# Patient Record
Sex: MALE | Race: WHITE | NOT HISPANIC OR LATINO | Employment: FULL TIME | URBAN - METROPOLITAN AREA
[De-identification: names, ages, dates, MRNs, and addresses within clinical notes are randomized per-mention and may not be internally consistent; named-entity substitution may affect disease eponyms.]

---

## 2024-03-06 ENCOUNTER — HOSPITAL ENCOUNTER (EMERGENCY)
Facility: HOSPITAL | Age: 29
Discharge: HOME | End: 2024-03-06
Payer: COMMERCIAL

## 2024-03-06 VITALS
HEART RATE: 88 BPM | WEIGHT: 185 LBS | TEMPERATURE: 98.6 F | SYSTOLIC BLOOD PRESSURE: 142 MMHG | BODY MASS INDEX: 27.4 KG/M2 | DIASTOLIC BLOOD PRESSURE: 94 MMHG | RESPIRATION RATE: 16 BRPM | OXYGEN SATURATION: 100 % | HEIGHT: 69 IN

## 2024-03-06 DIAGNOSIS — H61.23 BILATERAL IMPACTED CERUMEN: ICD-10-CM

## 2024-03-06 DIAGNOSIS — H92.02 LEFT EAR PAIN: Primary | ICD-10-CM

## 2024-03-06 PROCEDURE — 2500000001 HC RX 250 WO HCPCS SELF ADMINISTERED DRUGS (ALT 637 FOR MEDICARE OP): Performed by: PHYSICIAN ASSISTANT

## 2024-03-06 PROCEDURE — 2500000004 HC RX 250 GENERAL PHARMACY W/ HCPCS (ALT 636 FOR OP/ED): Performed by: PHYSICIAN ASSISTANT

## 2024-03-06 PROCEDURE — 99283 EMERGENCY DEPT VISIT LOW MDM: CPT

## 2024-03-06 RX ORDER — LORATADINE 10 MG/1
10 TABLET ORAL ONCE
Status: COMPLETED | OUTPATIENT
Start: 2024-03-06 | End: 2024-03-06

## 2024-03-06 RX ORDER — IBUPROFEN 600 MG/1
600 TABLET ORAL EVERY 6 HOURS PRN
Qty: 20 TABLET | Refills: 0 | Status: SHIPPED | OUTPATIENT
Start: 2024-03-06

## 2024-03-06 RX ORDER — LORATADINE 10 MG/1
10 TABLET ORAL DAILY
Qty: 20 TABLET | Refills: 0 | Status: SHIPPED | OUTPATIENT
Start: 2024-03-06 | End: 2024-03-26

## 2024-03-06 RX ORDER — ACETAMINOPHEN 325 MG/1
975 TABLET ORAL ONCE
Status: COMPLETED | OUTPATIENT
Start: 2024-03-06 | End: 2024-03-06

## 2024-03-06 RX ORDER — AMOXICILLIN 500 MG/1
500 CAPSULE ORAL 3 TIMES DAILY
Qty: 21 CAPSULE | Refills: 0 | Status: SHIPPED | OUTPATIENT
Start: 2024-03-06 | End: 2024-03-13

## 2024-03-06 RX ADMIN — LORATADINE 10 MG: 10 TABLET ORAL at 12:54

## 2024-03-06 RX ADMIN — ACETAMINOPHEN 975 MG: 325 TABLET ORAL at 12:54

## 2024-03-06 RX ADMIN — Medication 10 DROP: at 12:40

## 2024-03-06 ASSESSMENT — COLUMBIA-SUICIDE SEVERITY RATING SCALE - C-SSRS
2. HAVE YOU ACTUALLY HAD ANY THOUGHTS OF KILLING YOURSELF?: NO
6. HAVE YOU EVER DONE ANYTHING, STARTED TO DO ANYTHING, OR PREPARED TO DO ANYTHING TO END YOUR LIFE?: NO
1. IN THE PAST MONTH, HAVE YOU WISHED YOU WERE DEAD OR WISHED YOU COULD GO TO SLEEP AND NOT WAKE UP?: NO

## 2024-03-06 ASSESSMENT — PAIN SCALES - GENERAL: PAINLEVEL_OUTOF10: 8

## 2024-03-06 ASSESSMENT — PAIN - FUNCTIONAL ASSESSMENT: PAIN_FUNCTIONAL_ASSESSMENT: 0-10

## 2024-03-06 NOTE — ED PROVIDER NOTES
HPI   Chief Complaint   Patient presents with    Earache     L ear       28-year-old male presented emergency department the chief complaint of pain in his left ear.  This been for the last several days.  He notes decreased hearing.  He denies lightheadedness dizziness numbness weakness.  Well-appearing nontoxic afebrile.  No significant past medical history.  Has tried nothing for relief.                          Michelle Coma Scale Score: 15                     Patient History   No past medical history on file.  No past surgical history on file.  No family history on file.  Social History     Tobacco Use    Smoking status: Not on file    Smokeless tobacco: Not on file   Substance Use Topics    Alcohol use: Not on file    Drug use: Not on file       Physical Exam   ED Triage Vitals [03/06/24 1232]   Temperature Heart Rate Respirations BP   37 °C (98.6 °F) 88 16 (!) 142/94      Pulse Ox Temp src Heart Rate Source Patient Position   100 % -- -- --      BP Location FiO2 (%)     -- --       Physical Exam  Vitals and nursing note reviewed.   Constitutional:       Appearance: Normal appearance.   HENT:      Ears:      Comments: With bilateral cerumen impaction, unable to visualize tympanic membrane     Nose: Nose normal.   Cardiovascular:      Rate and Rhythm: Normal rate and regular rhythm.   Pulmonary:      Effort: Pulmonary effort is normal.   Abdominal:      General: Abdomen is flat.   Musculoskeletal:         General: Normal range of motion.      Cervical back: Normal range of motion.   Skin:     General: Skin is warm.   Neurological:      General: No focal deficit present.      Mental Status: He is alert and oriented to person, place, and time.   Psychiatric:         Mood and Affect: Mood normal.         ED Course & MDM   Diagnoses as of 03/06/24 1240   Left ear pain   Bilateral impacted cerumen       Medical Decision Making  I have seen and evaluated this patient.  Physician available for consultation.  Vital signs  have been reviewed.  All laboratory and diagnostic imaging is reviewed by myself and interpreted by myself unless otherwise stated.  Additionally imaging is interpreted by radiologist.    With cerumen impaction.  Difficulty visualizing tympanic membrane.  Will give patient Debrox drops.  The emergency department is fairly busy at the moment and will take them several hours probably before we are able to irrigate his ears.  He is given the option of a watch and wait antibiotic and taking Debrox drops at home and if after several days of Debrox treatments his pain is not resolved he can start the antibiotic and follow-up with ENT.  He is agreeable to this plan.  Released in good condition.    Labs Reviewed - No data to display  No orders to display     Medications   carbamide peroxide (Debrox) 6.5 % otic solution 10 drop (has no administration in time range)   acetaminophen (Tylenol) tablet 975 mg (has no administration in time range)   loratadine (Claritin) tablet 10 mg (has no administration in time range)     New Prescriptions    AMOXICILLIN (AMOXIL) 500 MG CAPSULE    Take 1 capsule (500 mg) by mouth 3 times a day for 7 days.    CARBAMIDE PEROXIDE (DEBROX) 6.5 % OTIC SOLUTION    Administer 5 drops into affected ear(s) 2 times a day for 4 days.    IBUPROFEN 600 MG TABLET    Take 1 tablet (600 mg) by mouth every 6 hours if needed for mild pain (1 - 3) for up to 20 doses.    LORATADINE (CLARITIN) 10 MG TABLET    Take 1 tablet (10 mg) by mouth once daily for 20 days.         Procedure  Procedures     Darin Banegas PA-C  03/06/24 6431

## 2025-01-12 ENCOUNTER — HOSPITAL ENCOUNTER (EMERGENCY)
Facility: HOSPITAL | Age: 30
Discharge: HOME | End: 2025-01-13

## 2025-01-12 ENCOUNTER — HOSPITAL ENCOUNTER (EMERGENCY)
Facility: HOSPITAL | Age: 30
Discharge: ANOTHER HOSPITAL | End: 2025-01-12
Attending: EMERGENCY MEDICINE

## 2025-01-12 VITALS
SYSTOLIC BLOOD PRESSURE: 126 MMHG | OXYGEN SATURATION: 98 % | HEART RATE: 78 BPM | DIASTOLIC BLOOD PRESSURE: 86 MMHG | WEIGHT: 175 LBS | TEMPERATURE: 97.8 F | RESPIRATION RATE: 16 BRPM | HEIGHT: 69 IN | BODY MASS INDEX: 25.92 KG/M2

## 2025-01-12 VITALS
DIASTOLIC BLOOD PRESSURE: 91 MMHG | WEIGHT: 175 LBS | OXYGEN SATURATION: 96 % | HEART RATE: 95 BPM | RESPIRATION RATE: 20 BRPM | SYSTOLIC BLOOD PRESSURE: 137 MMHG | HEIGHT: 69 IN | BODY MASS INDEX: 25.92 KG/M2 | TEMPERATURE: 97.7 F

## 2025-01-12 DIAGNOSIS — T15.91XA FB EYE, RIGHT, INITIAL ENCOUNTER: Primary | ICD-10-CM

## 2025-01-12 DIAGNOSIS — T15.91XA FOREIGN BODY OF RIGHT EYE, INITIAL ENCOUNTER: Primary | ICD-10-CM

## 2025-01-12 PROCEDURE — 99283 EMERGENCY DEPT VISIT LOW MDM: CPT

## 2025-01-12 PROCEDURE — 2500000001 HC RX 250 WO HCPCS SELF ADMINISTERED DRUGS (ALT 637 FOR MEDICARE OP): Performed by: PHYSICIAN ASSISTANT

## 2025-01-12 PROCEDURE — 99284 EMERGENCY DEPT VISIT MOD MDM: CPT

## 2025-01-12 PROCEDURE — 2500000005 HC RX 250 GENERAL PHARMACY W/O HCPCS

## 2025-01-12 PROCEDURE — 99284 EMERGENCY DEPT VISIT MOD MDM: CPT | Performed by: PHYSICIAN ASSISTANT

## 2025-01-12 PROCEDURE — 99285 EMERGENCY DEPT VISIT HI MDM: CPT | Performed by: EMERGENCY MEDICINE

## 2025-01-12 RX ORDER — TETRACAINE HYDROCHLORIDE 5 MG/ML
SOLUTION OPHTHALMIC
Status: COMPLETED
Start: 2025-01-12 | End: 2025-01-12

## 2025-01-12 RX ORDER — HYDROCODONE BITARTRATE AND ACETAMINOPHEN 5; 325 MG/1; MG/1
1 TABLET ORAL ONCE
Status: COMPLETED | OUTPATIENT
Start: 2025-01-12 | End: 2025-01-12

## 2025-01-12 RX ORDER — OXYCODONE HYDROCHLORIDE 5 MG/1
5 TABLET ORAL ONCE
Status: COMPLETED | OUTPATIENT
Start: 2025-01-12 | End: 2025-01-12

## 2025-01-12 RX ORDER — TETRACAINE HYDROCHLORIDE 5 MG/ML
1 SOLUTION OPHTHALMIC ONCE
Status: COMPLETED | OUTPATIENT
Start: 2025-01-12 | End: 2025-01-12

## 2025-01-12 RX ADMIN — WATER, PURIFIED: 99.05 LIQUID OPHTHALMIC at 21:08

## 2025-01-12 RX ADMIN — TETRACAINE HYDROCHLORIDE 1 DROP: 5 SOLUTION OPHTHALMIC at 21:08

## 2025-01-12 RX ADMIN — FLUORESCEIN SODIUM 1 STRIP: 1 STRIP OPHTHALMIC at 21:08

## 2025-01-12 RX ADMIN — HYDROCODONE BITARTRATE AND ACETAMINOPHEN 1 TABLET: 5; 325 TABLET ORAL at 21:07

## 2025-01-12 RX ADMIN — OXYCODONE 5 MG: 5 TABLET ORAL at 22:56

## 2025-01-12 ASSESSMENT — PAIN DESCRIPTION - PAIN TYPE
TYPE: ACUTE PAIN
TYPE: ACUTE PAIN

## 2025-01-12 ASSESSMENT — COLUMBIA-SUICIDE SEVERITY RATING SCALE - C-SSRS
2. HAVE YOU ACTUALLY HAD ANY THOUGHTS OF KILLING YOURSELF?: NO
1. IN THE PAST MONTH, HAVE YOU WISHED YOU WERE DEAD OR WISHED YOU COULD GO TO SLEEP AND NOT WAKE UP?: NO
6. HAVE YOU EVER DONE ANYTHING, STARTED TO DO ANYTHING, OR PREPARED TO DO ANYTHING TO END YOUR LIFE?: NO
1. IN THE PAST MONTH, HAVE YOU WISHED YOU WERE DEAD OR WISHED YOU COULD GO TO SLEEP AND NOT WAKE UP?: NO
2. HAVE YOU ACTUALLY HAD ANY THOUGHTS OF KILLING YOURSELF?: NO
6. HAVE YOU EVER DONE ANYTHING, STARTED TO DO ANYTHING, OR PREPARED TO DO ANYTHING TO END YOUR LIFE?: NO

## 2025-01-12 ASSESSMENT — PAIN DESCRIPTION - LOCATION
LOCATION: EYE
LOCATION: EYE

## 2025-01-12 ASSESSMENT — PAIN SCALES - GENERAL
PAINLEVEL_OUTOF10: 9
PAINLEVEL_OUTOF10: 8

## 2025-01-12 ASSESSMENT — PAIN - FUNCTIONAL ASSESSMENT
PAIN_FUNCTIONAL_ASSESSMENT: 0-10
PAIN_FUNCTIONAL_ASSESSMENT: 0-10

## 2025-01-12 ASSESSMENT — LIFESTYLE VARIABLES
EVER HAD A DRINK FIRST THING IN THE MORNING TO STEADY YOUR NERVES TO GET RID OF A HANGOVER: NO
HAVE YOU EVER FELT YOU SHOULD CUT DOWN ON YOUR DRINKING: NO
TOTAL SCORE: 0
HAVE PEOPLE ANNOYED YOU BY CRITICIZING YOUR DRINKING: NO
EVER FELT BAD OR GUILTY ABOUT YOUR DRINKING: NO

## 2025-01-12 ASSESSMENT — PAIN DESCRIPTION - ORIENTATION
ORIENTATION: RIGHT
ORIENTATION: RIGHT

## 2025-01-12 ASSESSMENT — PAIN DESCRIPTION - FREQUENCY: FREQUENCY: CONSTANT/CONTINUOUS

## 2025-01-12 NOTE — Clinical Note
Sher Bustos was seen and treated in our emergency department on 1/12/2025.  He may return to work on 01/16/2025.       If you have any questions or concerns, please don't hesitate to call.      Jeanne Hernandez PA-C

## 2025-01-13 RX ORDER — OXYCODONE AND ACETAMINOPHEN 5; 325 MG/1; MG/1
1 TABLET ORAL EVERY 6 HOURS PRN
Qty: 8 TABLET | Refills: 0 | Status: SHIPPED | OUTPATIENT
Start: 2025-01-13 | End: 2025-01-15

## 2025-01-13 RX ORDER — MOXIFLOXACIN 5 MG/ML
1 SOLUTION/ DROPS OPHTHALMIC 4 TIMES DAILY
Qty: 3 ML | Refills: 0 | Status: SHIPPED | OUTPATIENT
Start: 2025-01-13 | End: 2025-01-18

## 2025-01-13 RX ORDER — IBUPROFEN 600 MG/1
600 TABLET ORAL EVERY 6 HOURS PRN
Qty: 20 TABLET | Refills: 0 | Status: SHIPPED | OUTPATIENT
Start: 2025-01-13 | End: 2025-01-18

## 2025-01-13 RX ORDER — ARTIFICIAL TEARS 1; 2; 3 MG/ML; MG/ML; MG/ML
1 SOLUTION/ DROPS OPHTHALMIC AS NEEDED
Qty: 10 ML | Refills: 0 | Status: SHIPPED | OUTPATIENT
Start: 2025-01-13

## 2025-01-13 NOTE — ED TRIAGE NOTES
States it feels like there is something in his right eye. He thinks he got something in there on Friday night. The eye is painful, tearing and sensitive

## 2025-01-13 NOTE — CONSULTS
"Reason for consult: Metallic foreign body (FB) OD     History Of Present Illness  Sher Bustos is a 29 y.o. male without significant ocular hx who presenting with 3 days of right eye pain, foreign body sensation, and light sensitivity. 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 He reports showering after returning home from work on Friday where he had exposure to dust and dirt, and feeling suddenly something fall into his right eye. He felt immediate FBS and had tearing. Initially he had improvement but this has since continued to worsen, especially photophobia. Vision is overall stable but slightly more blurry in the right eye. He was seen at an outside ED where he said a provider attempted to remove the possible metal with a needle and \"drill\", but was unsuccessful. Denies any gush of fluid, flashes, floaters, or sudden vision loss.      Past Medical History  He has no past medical history on file.    Family History: reviewed and not pertinent to chief complaint  Medications: please refer to medication reconciliation  Allergies: please refer to patient allergy list    Physical Exam  Base Eye Exam       Visual Acuity (Snellen - Linear)         Right Left    Near sc 20/20 20/20              Tonometry (Tonopen, 12:07 AM)         Right Left    Pressure 12 12              Pupils         Dark Light Shape React APD    Right 4 2 Round Brisk None    Left 4 2 Round Brisk None              Visual Fields (Counting fingers)         Left Right     Full Full              Extraocular Movement         Right Left     Full, Ortho Full, Ortho              Neuro/Psych       Oriented x3: Yes              Dilation       Both eyes: 1% Mydriacyl & 2.5% Rakan  @ 12:07 AM                  Additional Tests       Color         Right Left    Ishihara 14/14 13.5/14                  Slit Lamp and Fundus Exam       Slit Lamp Exam         Right Left    Lids/Lashes Normal Normal    Conjunctiva/Sclera 1+ " injection White and quiet    Cornea pinpoint FB with 0.5mm rust ring at 9:00 mid to far peripheral. 1x0.5mm residual epi defect Clear    Anterior Chamber Deep and quiet Deep and quiet    Iris Round and reactive Round and reactive    Lens Clear Clear    Anterior Vitreous Normal Normal              Fundus Exam         Right Left    Posterior Vitreous Normal Normal    Disc Normal Normal    C/D Ratio 0.3 0.3    Macula Normal Normal    Vessels Normal Normal    Periphery Normal Normal                    Assessment:  29 y.o. male without significant ocular hx who presenting with 3 days of right eye pain, foreign body sensation, and light sensitivity.      #Corneal foreign body (FB), OD   - Source: drywall vs metal  - No signs of penetrating globe injury on exam, Carol Ann negative  - foreign body (FB) removed at the slit lamp with 30G needle. Carol Ann negative afterwards. Residual epi defect 1x0.5mm  - Eye irrigated copiously afterwards, fornicies swept, no other FB identified  - Start artificial tears QID OD  - Start moxifloxacin QID OD for 5 days, then stop    Recommend follow-up with  [facility], within 1 week. Please call [phone] (EYES) to make appointment.    Thank you for the consult. Note not final until attending signature. Please contact the Ophthalmology service with further questions or concerns.    Jean Rodrigues MD  Department of Ophthalmology, PGY-3    Ophthalmology Adult Pager - [phone]  Ophthalmology Pediatrics Pager - [phone]     For adult follow-up appointments, call: [phone]  For pediatric follow-up appointments, call: [phone]

## 2025-01-13 NOTE — ED TRIAGE NOTES
Pt to ED, as a transfer from [facility], following foreign object in eye.  2 days ago, pt noted something was irritating his eye. Pt tried rinsing his eye out with no relief. Seen at [facility] and sent here for optho.

## 2025-01-13 NOTE — ED PROVIDER NOTES
"Emergency Department Encounter  [facility] EMERGENCY MEDICINE    Patient: Sher Bustos  MRN: [id number]  : 1995  Date of Evaluation: 2025  ED Provider: Jeanne Hernandez PA-C        History of Present Illness     This is a 29-year-old male with past medical history of asthma who presents to the ED with right eye pain for the past 2 days.  He states that on Friday after work he showered and was drying his hair with a towel when all of a sudden he had pain to the right eye.  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  He is unsure of what got into the eye.  He states that he does wear glasses but denies wearing them at this time.  He denies any current contact use.  He denies any previous eye surgeries or procedures.  He endorses severe pain to this eye as well as photophobia.  He denies any blurry vision or double vision.  He was seen at [facility] for this problem was found to have a metallic appearing foreign body to the right eye with a rust ring present.  He was transferred here for ophthalmology evaluation.  He states that his tetanus shot is up-to-date.      History provided by:  Patient           Visit Vitals  BP (!) 137/91   Pulse 95   Temp 36.5 °C (97.7 °F) (Temporal)   Resp 20   Ht 1.753 m (5' 9\")   Wt 79.4 kg (175 lb)   SpO2 96%   BMI 25.84 kg/m²   BSA 1.97 m²          Physical Exam       Triage vitals:  T 36.5 °C (97.7 °F)  HR 95  BP (!) 137/91  RR 20  O2 96 % None (Room air)    Physical Exam     Physical exam:   General: Vitals noted, no distress. Afebrile.   EENT:  Hearing grossly intact. Normal phonation. MMM. Airway patient. PERRL. EOMI. significant conjunctival injection noted to the right eye compared to left with a visualized foreign body at approximately 9:00 on the iris.  Neck: No midline tenderness or paraspinal tenderness. FROM.   Cardiac: Regular, rate, rhythm. Normal S1 and S2.  No murmurs, gallops, rubs.   Pulmonary: Good air exchange. " Lungs clear bilaterally. No wheezes, rhonchi, rales. No accessory muscle use.   Extremities: No peripheral edema.  Full range of motion. Moves all extremities freely.   Skin: No rash. Warm and Dry.   Neuro: No focal neurologic deficits. CN 2-12 grossly intact. Sensation equal bilaterally. No weakness.       Results       Labs Reviewed - No data to display    No orders to display         Medical Decision Making & ED Course         ED Course & [facility]     Medical Decision Making  This is a 29-year-old male with a past medical history of asthma who presents to the ED as a transfer from [place] for ophthalmology consultation due to a right eye foreign body.  Vital stable upon arrival to the ED.  On examination patient does have a visualized foreign body present to the right eye at approximately 9:00 on the iris.  There is  significant conjunctival injection noted to the right eye compared to the left.  EOMI.  PERRL.  Exam otherwise grossly unremarkable.  Ophthalmology consulted.  Recent visit to [facility] reviewed.  Patient ordered  oxycodone for his pain.  Patient was seen and evaluated by ophthalmology here in the ED and they were able to remove the patient's foreign body in his eye.  They recommended outpatient follow-up in 1 week for this patient.  He was written prescriptions for moxifloxacin eyedrops, artificial tears, Motrin and Percocet for his pain at home.  [place]ARRS report was checked which showed no active opiate prescriptions.  Patient was provided with a note for his job.  He was given signs and symptoms to return to the ED with and was discharged from the emergency department in stable condition.  He had no further questions at time of discharge.    Risk  Prescription drug management.         Diagnoses as of 01/13/25 0050   Foreign body of right eye, initial encounter                The patient was discussed with the following consultants/services:  Ophthalmology who saw antibiotic the patient at bedside and remove  the foreign body from patient's eye.  They recommended artificial tears and moxifloxacin eyedrops.        Disposition   As a result of the work-up, the patient was discharged home.  he was informed of his diagnosis and instructed to come back with any concerns or worsening of condition.  he and was agreeable to the plan as discussed above.  he was given the opportunity to ask questions.  All of the patient's questions were answered.    Procedures     Patient was seen independently    Procedures    Jeanne Hernandez PA-C  Emergency Medicine      Jeanne Hernandez PA-C  01/13/25 0050

## 2025-01-13 NOTE — ED PROVIDER NOTES
HPI   Chief Complaint   Patient presents with    Foreign Body in Eye     States it feels like there is something in his right eye. He thinks he got something in there on Friday night. The eye is painful, tearing and sensitive       History of present illness:  29-year-old male presents emergency room with complaints of irritation to his right eye.  The patient states that 2 days ago after The shower he was drying himself off of tile and he believes that something was on the tile down to his eye.  He states his eye has been irritated for the past 2 days and he states has been getting worse.  He states he has tried rinsing out his eye multiple times of contact solution but states he has not had any relief.  He states that he does not use any contacts himself and denies any other injuries.  He has no previous medical history.  He denies any other symptoms at this time.    Social history: Negative for alcohol and drug use.    Review of systems:   Gen.: No weight loss,  fever.   Eyes: No vision loss, double vision, drainage  ENT: No pharyngitis, dry mouth.   Cardiac: No chest pain, palpitations, syncope  Pulmonary: No shortness of breath, cough, hemoptysis.   Heme/lymph: No swollen glands, fever, bleeding.   GI: No abdominal pain, change in bowel habits, melena, hematemesis, hematochezia, nausea, vomiting, diarrhea.   : No discharge, dysuria, frequency, urgency, hematuria.   Musculoskeletal: No limb pain, joint pain, joint swelling.   Skin: No rashes.   Review of systems is otherwise negative unless stated above or in history of present illness.        Physical exam:  General: Vitals noted, no distress. Afebrile.   EENT: No lymphadenopathy appreciated, sclera is injected in the right eye and there is a foreign body embedded in the cornea at the 9 o'clock position  Cardiac: Regular, rate, rhythm, no murmur.   Pulmonary: Lungs clear bilaterally with good aeration. No adventitious breath sounds.   Abdomen: Soft,  nonsurgical. Nontender. No peritoneal signs. Normoactive bowel sounds.   Extremities: No peripheral edema.   Skin: No rash.   Neuro: No focal neurologic deficits      Medical decision making:   Testing: Medical exam, Woods lamp exam: Negative Carol Ann sign  Treatment: Irrigation and Q-tip was initially used to try and dislodge the rust that is lodged in the cornea at this time but was unsuccessful 18-gauge needle was also used and some of the rust was dislodged and removed but there is still some left behind, attempted to use a Dremel as well and also was able to remove slightly more but there is appear to be still some that may have been deeper into the eye, repeat Bustillo lamp was performed after attempts to remove the foreign body performed and showed once again negative Carol Ann sign and only the foreign body presence.   Reevaluation:   Plan: 29-year-old male presents emergency room with complaints of irritation to his right eye.  The patient states that 2 days ago after The shower he was drying himself off of tile and he believes that something was on the tile down to his eye.  He states his eye has been irritated for the past 2 days and he states has been getting worse.  He states he has tried rinsing out his eye multiple times of contact solution but states he has not had any relief.  He states that he does not use any contacts himself and denies any other injuries.  He has no previous medical history.  He denies any other symptoms at this time. EENT: No lymphadenopathy appreciated, sclera is injected in the right eye and there is a foreign body embedded in the cornea at the 9 o'clock position  Cardiac: Regular, rate, rhythm, no murmur.   Pulmonary: Lungs clear bilaterally with good aeration. No adventitious breath sounds.   Abdomen: Soft, nonsurgical. Nontender. No peritoneal signs. Normoactive bowel sounds.  I explained to the patient I did speak with ophthalmology at [facility] and explained to him that did  be happy to see the patient in the emergency room to remove the foreign body and the rust ring tonight or he could wait till tomorrow morning and go to the outpatient clinic.  The patient opted to go tonight and requested that his girlfriend who is at bedside drive him.  I felt that this was appropriate.  EMTALA form was signed and filed with nursing staff.  Patient was evaluated by the attending physician as well and he once again verbalized that he wanted to proceed with this plan.  He is given instructions to go directly to the ED at this time and his girlfriend agreed to drive him there directly at this time for further care and treatment.  I spoke with the ED attending as well who is in agreement of this plan.  Impression:   1.  Retained foreign body in the right eye            History provided by:  Patient   used: No            Patient History   No past medical history on file.  No past surgical history on file.  No family history on file.  Social History     Tobacco Use    Smoking status: Not on file    Smokeless tobacco: Not on file   Substance Use Topics    Alcohol use: Not on file    Drug use: Not on file       Physical Exam   ED Triage Vitals [01/12/25 2006]   Temperature Heart Rate Respirations BP   36.8 °C (98.3 °F) 79 16 123/89      Pulse Ox Temp Source Heart Rate Source Patient Position   97 % Oral Monitor Sitting      BP Location FiO2 (%)     Left arm --       Physical Exam      ED Course & MDM   Diagnoses as of 01/13/25 1332   FB eye, right, initial encounter                 No data recorded                                 Medical Decision Making      Procedure  Procedures     Miquel Bartholomew PA-C  01/13/25 7743

## 2025-01-23 ENCOUNTER — APPOINTMENT (OUTPATIENT)
Dept: OPHTHALMOLOGY | Facility: CLINIC | Age: 30
End: 2025-01-23